# Patient Record
Sex: FEMALE | Race: WHITE | NOT HISPANIC OR LATINO | Employment: PART TIME | URBAN - NONMETROPOLITAN AREA
[De-identification: names, ages, dates, MRNs, and addresses within clinical notes are randomized per-mention and may not be internally consistent; named-entity substitution may affect disease eponyms.]

---

## 2022-04-21 ENCOUNTER — OFFICE VISIT (OUTPATIENT)
Dept: NEUROLOGY | Facility: CLINIC | Age: 47
End: 2022-04-21

## 2022-04-21 ENCOUNTER — LAB (OUTPATIENT)
Dept: LAB | Facility: HOSPITAL | Age: 47
End: 2022-04-21

## 2022-04-21 VITALS
DIASTOLIC BLOOD PRESSURE: 82 MMHG | HEIGHT: 71 IN | SYSTOLIC BLOOD PRESSURE: 140 MMHG | TEMPERATURE: 97.1 F | BODY MASS INDEX: 41.02 KG/M2 | HEART RATE: 82 BPM | OXYGEN SATURATION: 99 % | WEIGHT: 293 LBS

## 2022-04-21 DIAGNOSIS — R20.0 RIGHT FACIAL NUMBNESS: ICD-10-CM

## 2022-04-21 DIAGNOSIS — G44.219 FREQUENT EPISODIC TENSION-TYPE HEADACHE, NOT INTRACTABLE: ICD-10-CM

## 2022-04-21 DIAGNOSIS — R90.82 WHITE MATTER ABNORMALITY ON MRI OF BRAIN: ICD-10-CM

## 2022-04-21 DIAGNOSIS — R90.82 WHITE MATTER ABNORMALITY ON MRI OF BRAIN: Primary | ICD-10-CM

## 2022-04-21 DIAGNOSIS — E66.01 CLASS 3 SEVERE OBESITY DUE TO EXCESS CALORIES WITHOUT SERIOUS COMORBIDITY WITH BODY MASS INDEX (BMI) OF 40.0 TO 44.9 IN ADULT: ICD-10-CM

## 2022-04-21 LAB
ALBUMIN SERPL-MCNC: 5.2 G/DL (ref 3.5–5.2)
ALBUMIN/GLOB SERPL: 1.9 G/DL
ALP SERPL-CCNC: 67 U/L (ref 39–117)
ALT SERPL W P-5'-P-CCNC: 27 U/L (ref 1–33)
ANION GAP SERPL CALCULATED.3IONS-SCNC: 12.4 MMOL/L (ref 5–15)
AST SERPL-CCNC: 20 U/L (ref 1–32)
BASOPHILS # BLD AUTO: 0.05 10*3/MM3 (ref 0–0.2)
BASOPHILS NFR BLD AUTO: 0.8 % (ref 0–1.5)
BILIRUB SERPL-MCNC: 0.4 MG/DL (ref 0–1.2)
BUN SERPL-MCNC: 10 MG/DL (ref 6–20)
BUN/CREAT SERPL: 13.2 (ref 7–25)
CALCIUM SPEC-SCNC: 10.3 MG/DL (ref 8.6–10.5)
CHLORIDE SERPL-SCNC: 101 MMOL/L (ref 98–107)
CHOLEST SERPL-MCNC: 197 MG/DL (ref 0–200)
CO2 SERPL-SCNC: 25.6 MMOL/L (ref 22–29)
CREAT SERPL-MCNC: 0.76 MG/DL (ref 0.57–1)
DEPRECATED RDW RBC AUTO: 39 FL (ref 37–54)
EGFRCR SERPLBLD CKD-EPI 2021: 98.6 ML/MIN/1.73
EOSINOPHIL # BLD AUTO: 0.13 10*3/MM3 (ref 0–0.4)
EOSINOPHIL NFR BLD AUTO: 2.1 % (ref 0.3–6.2)
ERYTHROCYTE [DISTWIDTH] IN BLOOD BY AUTOMATED COUNT: 12.1 % (ref 12.3–15.4)
FOLATE SERPL-MCNC: 15.5 NG/ML (ref 4.78–24.2)
GLOBULIN UR ELPH-MCNC: 2.8 GM/DL
GLUCOSE SERPL-MCNC: 90 MG/DL (ref 65–99)
HCT VFR BLD AUTO: 41.2 % (ref 34–46.6)
HDLC SERPL QL: 3.58
HDLC SERPL-MCNC: 55 MG/DL (ref 40–60)
HGB BLD-MCNC: 13.8 G/DL (ref 12–15.9)
IMM GRANULOCYTES # BLD AUTO: 0.02 10*3/MM3 (ref 0–0.05)
IMM GRANULOCYTES NFR BLD AUTO: 0.3 % (ref 0–0.5)
LDLC SERPL CALC-MCNC: 111 MG/DL (ref 0–100)
LYMPHOCYTES # BLD AUTO: 1.84 10*3/MM3 (ref 0.7–3.1)
LYMPHOCYTES NFR BLD AUTO: 29.2 % (ref 19.6–45.3)
MCH RBC QN AUTO: 29.8 PG (ref 26.6–33)
MCHC RBC AUTO-ENTMCNC: 33.5 G/DL (ref 31.5–35.7)
MCV RBC AUTO: 89 FL (ref 79–97)
MONOCYTES # BLD AUTO: 0.59 10*3/MM3 (ref 0.1–0.9)
MONOCYTES NFR BLD AUTO: 9.4 % (ref 5–12)
NEUTROPHILS NFR BLD AUTO: 3.68 10*3/MM3 (ref 1.7–7)
NEUTROPHILS NFR BLD AUTO: 58.2 % (ref 42.7–76)
NRBC BLD AUTO-RTO: 0 /100 WBC (ref 0–0.2)
PLATELET # BLD AUTO: 291 10*3/MM3 (ref 140–450)
PMV BLD AUTO: 11.3 FL (ref 6–12)
POTASSIUM SERPL-SCNC: 4.2 MMOL/L (ref 3.5–5.2)
PROT SERPL-MCNC: 8 G/DL (ref 6–8.5)
RBC # BLD AUTO: 4.63 10*6/MM3 (ref 3.77–5.28)
SODIUM SERPL-SCNC: 139 MMOL/L (ref 136–145)
T-UPTAKE NFR SERPL: 1.1 TBI (ref 0.8–1.3)
T4 SERPL-MCNC: 8.7 MCG/DL (ref 4.5–11.7)
TRIGL SERPL-MCNC: 177 MG/DL (ref 0–150)
TSH SERPL DL<=0.05 MIU/L-ACNC: 3.16 UIU/ML (ref 0.27–4.2)
VIT B12 BLD-MCNC: 511 PG/ML (ref 211–946)
VLDLC SERPL-MCNC: 31 MG/DL (ref 5–40)
WBC NRBC COR # BLD: 6.31 10*3/MM3 (ref 3.4–10.8)

## 2022-04-21 PROCEDURE — 84436 ASSAY OF TOTAL THYROXINE: CPT

## 2022-04-21 PROCEDURE — 99204 OFFICE O/P NEW MOD 45 MIN: CPT | Performed by: NURSE PRACTITIONER

## 2022-04-21 PROCEDURE — 83921 ORGANIC ACID SINGLE QUANT: CPT

## 2022-04-21 PROCEDURE — 82746 ASSAY OF FOLIC ACID SERUM: CPT

## 2022-04-21 PROCEDURE — 82607 VITAMIN B-12: CPT

## 2022-04-21 PROCEDURE — 80061 LIPID PANEL: CPT

## 2022-04-21 PROCEDURE — 84479 ASSAY OF THYROID (T3 OR T4): CPT

## 2022-04-21 PROCEDURE — 80050 GENERAL HEALTH PANEL: CPT

## 2022-04-21 PROCEDURE — 36415 COLL VENOUS BLD VENIPUNCTURE: CPT

## 2022-04-21 RX ORDER — LISINOPRIL 10 MG/1
TABLET ORAL
COMMUNITY

## 2022-04-21 RX ORDER — TOPIRAMATE 50 MG/1
TABLET, FILM COATED ORAL
Qty: 67 TABLET | Refills: 1 | Status: SHIPPED | OUTPATIENT
Start: 2022-04-21 | End: 2022-06-17 | Stop reason: DRUGHIGH

## 2022-04-21 RX ORDER — LEVOTHYROXINE SODIUM 0.05 MG/1
TABLET ORAL
COMMUNITY
Start: 2022-04-18
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
## 2022-04-22 DIAGNOSIS — E78.5 DYSLIPIDEMIA, GOAL LDL BELOW 100: Primary | ICD-10-CM

## 2022-04-22 RX ORDER — ATORVASTATIN CALCIUM 20 MG/1
20 TABLET, FILM COATED ORAL DAILY
Qty: 90 TABLET | Refills: 0 | Status: SHIPPED | OUTPATIENT
Start: 2022-04-22 | End: 2022-08-18 | Stop reason: SDDI

## 2022-04-30 LAB — METHYLMALONATE SERPL-SCNC: 124 NMOL/L (ref 0–378)

## 2022-06-01 ENCOUNTER — HOSPITAL ENCOUNTER (OUTPATIENT)
Dept: ULTRASOUND IMAGING | Facility: HOSPITAL | Age: 47
Discharge: HOME | End: 2022-06-01

## 2022-06-01 ENCOUNTER — HOSPITAL ENCOUNTER (OUTPATIENT)
Dept: MRI IMAGING | Facility: HOSPITAL | Age: 47
Discharge: HOME | End: 2022-06-01

## 2022-06-01 DIAGNOSIS — R20.0 RIGHT FACIAL NUMBNESS: ICD-10-CM

## 2022-06-01 DIAGNOSIS — R90.82 WHITE MATTER ABNORMALITY ON MRI OF BRAIN: ICD-10-CM

## 2022-06-01 PROCEDURE — 70553 MRI BRAIN STEM W/O & W/DYE: CPT

## 2022-06-01 PROCEDURE — 93880 EXTRACRANIAL BILAT STUDY: CPT

## 2022-06-01 PROCEDURE — 0 GADOBENATE DIMEGLUMINE 529 MG/ML SOLUTION: Performed by: NURSE PRACTITIONER

## 2022-06-01 PROCEDURE — A9577 INJ MULTIHANCE: HCPCS | Performed by: NURSE PRACTITIONER

## 2022-06-01 RX ADMIN — GADOBENATE DIMEGLUMINE 27 ML: 529 INJECTION, SOLUTION INTRAVENOUS at 14:21

## 2022-06-17 ENCOUNTER — OFFICE VISIT (OUTPATIENT)
Dept: NEUROLOGY | Facility: CLINIC | Age: 47
End: 2022-06-17

## 2022-06-17 ENCOUNTER — PREP FOR SURGERY (OUTPATIENT)
Dept: OTHER | Facility: HOSPITAL | Age: 47
End: 2022-06-17

## 2022-06-17 VITALS
BODY MASS INDEX: 41.02 KG/M2 | OXYGEN SATURATION: 98 % | TEMPERATURE: 97.1 F | WEIGHT: 293 LBS | HEART RATE: 80 BPM | HEIGHT: 71 IN | SYSTOLIC BLOOD PRESSURE: 130 MMHG | DIASTOLIC BLOOD PRESSURE: 74 MMHG

## 2022-06-17 DIAGNOSIS — R20.0 RIGHT FACIAL NUMBNESS: ICD-10-CM

## 2022-06-17 DIAGNOSIS — Z01.812 ENCOUNTER FOR PREPROCEDURE SCREENING LABORATORY TESTING FOR COVID-19: ICD-10-CM

## 2022-06-17 DIAGNOSIS — R26.9 GAIT DIFFICULTY: ICD-10-CM

## 2022-06-17 DIAGNOSIS — R90.82 WHITE MATTER ABNORMALITY ON MRI OF BRAIN: Primary | ICD-10-CM

## 2022-06-17 DIAGNOSIS — G44.219 FREQUENT EPISODIC TENSION-TYPE HEADACHE, NOT INTRACTABLE: Primary | ICD-10-CM

## 2022-06-17 DIAGNOSIS — Z20.822 ENCOUNTER FOR PREPROCEDURE SCREENING LABORATORY TESTING FOR COVID-19: ICD-10-CM

## 2022-06-17 PROCEDURE — 99213 OFFICE O/P EST LOW 20 MIN: CPT | Performed by: NURSE PRACTITIONER

## 2022-06-17 RX ORDER — TOPIRAMATE 50 MG/1
50 TABLET, FILM COATED ORAL 2 TIMES DAILY
Qty: 180 TABLET | Refills: 1 | Status: SHIPPED | OUTPATIENT
Start: 2022-06-17 | End: 2022-08-18 | Stop reason: ALTCHOICE

## 2022-06-17 NOTE — PROGRESS NOTES
"     Follow Up Neurology Office Visit      Patient Name: Purnima Valdes    Referring Physician: No ref. provider found    Chief Complaint:    Chief Complaint   Patient presents with   • Follow-up     White matter abnormality on MRI.       History of Present Illness: Purnima Valdes is a 47 y.o. female who is here to follow up with Neurology for headaches and known white matter abnormality on MRI. She does feel that headaches have improved with topiramate.  Estimates about 1 headache day per week.  Complains of feeling weak, particularly when rising from standing position.    The following portions of the patient's history were reviewed and updated as appropriate: allergies, current medications, past family history, past medical history, past social history, past surgical history and problem list.    Subjective     Review of Systems:   Review of Systems   Constitutional: Positive for fatigue.   Neurological: Positive for weakness and headache.   All other systems reviewed and are negative.    Medications:     Current Outpatient Medications:   •  levothyroxine (SYNTHROID, LEVOTHROID) 50 MCG tablet, , Disp: , Rfl:   •  lisinopril (PRINIVIL,ZESTRIL) 10 MG tablet, , Disp: , Rfl:   •  atorvastatin (Lipitor) 20 MG tablet, Take 1 tablet by mouth Daily., Disp: 90 tablet, Rfl: 0  •  topiramate (Topamax) 50 MG tablet, Take 1 tablet by mouth 2 (Two) Times a Day., Disp: 180 tablet, Rfl: 1    Allergies:   No Known Allergies    Objective     Physical Exam:  Vital Signs:   Vitals:    06/17/22 0941   BP: 130/74   BP Location: Right arm   Patient Position: Sitting   Cuff Size: Adult   Pulse: 80   Temp: 97.1 °F (36.2 °C)   TempSrc: Infrared   SpO2: 98%   Weight: 134 kg (295 lb 3.2 oz)   Height: 180.3 cm (71\")       Physical Exam  Vitals and nursing note reviewed.   Pulmonary:      Effort: Pulmonary effort is normal.   Neurological:      Mental Status: She is oriented to person, place, and time. Mental status is at baseline.      Gait: Gait is " intact.      Deep Tendon Reflexes: Strength normal.   Psychiatric:         Speech: Speech normal.         Behavior: Behavior normal.       Neurologic Exam     Mental Status   Oriented to person, place, and time.   Attention: normal. Concentration: normal.   Speech: speech is normal   Level of consciousness: alert  Normal comprehension.     Cranial Nerves   Cranial nerves II through XII intact.     Motor Exam   Muscle bulk: normal  Overall muscle tone: normal    Strength   Strength 5/5 throughout.     Gait, Coordination, and Reflexes     Gait  Gait: normal    Tremor   Resting tremor: absent    Results Review:   I have reviewed the patient's other medical records to include, labs, radiology and referrals.   I reviewed the patient's new imaging results and agree with the interpretation.    Assessment / Plan      Assessment/Plan:   Diagnoses and all orders for this visit:    1. Frequent episodic tension-type headache, not intractable (Primary)  -     topiramate (Topamax) 50 MG tablet; Take 1 tablet by mouth 2 (Two) Times a Day.  Dispense: 180 tablet; Refill: 1    2. Gait difficulty  -     Ambulatory Referral to Physical Therapy Evaluate and treat       Follow Up:   Return in about 6 months (around 12/17/2022).     MART Gannon  [facility]   AS THE PROVIDER, I PERSONALLY WORE PPE DURING ENTIRE FACE TO FACE ENCOUNTER IN CLINIC WITH THE PATIENT. PATIENT ALSO WORE PPE DURING ENTIRE FACE TO FACE ENCOUNTER EXCEPT FOR A MAX OF 30 SECONDS DURING NEUROLOGICAL EVALUATION OF CRANIAL NERVES AND THEN MASK WAS PLACED BACK OVER PATIENT FACE FOR REMAINDER OF VISIT. I WASHED MY HANDS BEFORE AND AFTER VISIT.    Please note that portions of this note may have been completed with a voice recognition program. Efforts were made to edit the dictations, but occasionally words are mistranscribed.

## 2022-06-20 ENCOUNTER — APPOINTMENT (OUTPATIENT)
Dept: GENERAL RADIOLOGY | Facility: HOSPITAL | Age: 47
End: 2022-06-20

## 2022-08-18 ENCOUNTER — OFFICE VISIT (OUTPATIENT)
Dept: NEUROLOGY | Facility: CLINIC | Age: 47
End: 2022-08-18

## 2022-08-18 VITALS
HEART RATE: 73 BPM | DIASTOLIC BLOOD PRESSURE: 90 MMHG | SYSTOLIC BLOOD PRESSURE: 144 MMHG | HEIGHT: 71 IN | OXYGEN SATURATION: 99 % | BODY MASS INDEX: 41.02 KG/M2 | WEIGHT: 293 LBS | TEMPERATURE: 97.7 F

## 2022-08-18 DIAGNOSIS — R20.2 PARESTHESIA OF BOTH LOWER EXTREMITIES: ICD-10-CM

## 2022-08-18 DIAGNOSIS — G44.219 FREQUENT EPISODIC TENSION-TYPE HEADACHE, NOT INTRACTABLE: Primary | ICD-10-CM

## 2022-08-18 PROCEDURE — 99213 OFFICE O/P EST LOW 20 MIN: CPT | Performed by: NURSE PRACTITIONER

## 2022-08-18 RX ORDER — ZONISAMIDE 100 MG/1
100 CAPSULE ORAL EVERY EVENING
Qty: 90 CAPSULE | Refills: 1 | Status: SHIPPED | OUTPATIENT
Start: 2022-08-18

## 2022-08-18 RX ORDER — FERROUS SULFATE 325(65) MG
1 TABLET ORAL DAILY
COMMUNITY
Start: 2022-07-21

## 2022-08-18 NOTE — PROGRESS NOTES
"     Follow Up Neurology Office Visit      Patient Name: Purnima Valdes    Referring Physician: No ref. provider found    Chief Complaint:    Chief Complaint   Patient presents with   • Follow-up     Pt in office to follow up on numbness right side in feet, legs, arm, and stomach. Pt stated this numbness started on Saturday August, 13th       History of Present Illness: Purnima Valdes is a 47 y.o. female who is here to follow up with Neurology for paresthesia in lower extremity.  She reports that this began on Saturday, August 13.  Went to outside hospital, underwent abdominal CT.  No falls.  Reports numbness is in lateral distribution and both legs.  No longer taking statin due to PCP advised.  States she was told to initiate lifestyle modifications, admits that she has not done so.    The following portions of the patient's history were reviewed and updated as appropriate: allergies, current medications, past family history, past medical history, past social history, past surgical history and problem list.    Subjective     Review of Systems:   Review of Systems    Medications:     Current Outpatient Medications:   •  levothyroxine (SYNTHROID, LEVOTHROID) 50 MCG tablet, , Disp: , Rfl:   •  lisinopril (PRINIVIL,ZESTRIL) 10 MG tablet, , Disp: , Rfl:   •  Vitamin D3 Maximum Strength 125 MCG (5000 UT) capsule capsule, Take 1 capsule by mouth Daily., Disp: , Rfl:   •  zonisamide (ZONEGRAN) 100 MG capsule, Take 1 capsule by mouth Every Evening., Disp: 90 capsule, Rfl: 1    Allergies:   No Known Allergies    Objective     Physical Exam:  Vital Signs:   Vitals:    08/18/22 1339   BP: 144/90   BP Location: Right arm   Patient Position: Sitting   Cuff Size: Adult   Pulse: 73   Temp: 97.7 °F (36.5 °C)   SpO2: 99%   Weight: 135 kg (296 lb 9.6 oz)   Height: 180.3 cm (71\")   PainSc: 0-No pain       Physical Exam  Vitals and nursing note reviewed.   Constitutional:       Appearance: She is morbidly obese.   Pulmonary:      Effort: " Pulmonary effort is normal.   Neurological:      General: No focal deficit present.      Mental Status: She is oriented to person, place, and time.      Coordination: Heel to Shin Test normal.      Gait: Gait is intact.      Deep Tendon Reflexes: Strength normal.      Reflex Scores:       Patellar reflexes are 2+ on the right side and 2+ on the left side.       Achilles reflexes are 1+ on the right side and 1+ on the left side.  Psychiatric:         Speech: Speech normal.         Thought Content: Thought content normal.         Neurologic Exam     Mental Status   Oriented to person, place, and time.   Attention: normal. Concentration: normal.   Speech: speech is normal   Level of consciousness: alert  Normal comprehension.     Cranial Nerves   Cranial nerves II through XII intact.     Motor Exam   Muscle bulk: normal  Overall muscle tone: normal    Strength   Strength 5/5 throughout.     Sensory Exam   Right leg light touch: normal  Left leg light touch: normal  Right leg vibration: normal  Left leg vibration: normal    Gait, Coordination, and Reflexes     Gait  Gait: normal    Coordination   Heel to shin coordination: normal    Tremor   Resting tremor: absent    Reflexes   Right patellar: 2+  Left patellar: 2+  Right achilles: 1+  Left achilles: 1+  Right pendular knee jerk: absent    Results Review:   I reviewed the patient's new clinical results.  I have reviewed the patient's other medical records to include, labs, radiology and referrals.   I reviewed the patient's new imaging results and agree with the interpretation.    Assessment / Plan      Assessment/Plan:   Diagnoses and all orders for this visit:    1. Frequent episodic tension-type headache, not intractable (Primary)  -     zonisamide (ZONEGRAN) 100 MG capsule; Take 1 capsule by mouth Every Evening.  Dispense: 90 capsule; Refill: 1    2. Paresthesia of both lower extremities    We discussed that paresthesia may be related to topiramate, or meralgia  paresthetica.  Patient has had improved headache on topiramate therapy, change to zonisamide.    Follow Up:   Return in about 17 weeks (around 12/15/2022) for Next scheduled follow up.    [facility]   AS THE PROVIDER, I PERSONALLY WORE PPE DURING ENTIRE FACE TO FACE ENCOUNTER IN CLINIC WITH THE PATIENT. PATIENT ALSO WORE PPE DURING ENTIRE FACE TO FACE ENCOUNTER EXCEPT FOR A MAX OF 30 SECONDS DURING NEUROLOGICAL EVALUATION OF CRANIAL NERVES AND THEN MASK WAS PLACED BACK OVER PATIENT FACE FOR REMAINDER OF VISIT. I WASHED MY HANDS BEFORE AND AFTER VISIT.    Please note that portions of this note may have been completed with a voice recognition program. Efforts were made to edit the dictations, but occasionally words are mistranscribed.